# Patient Record
Sex: FEMALE | Race: BLACK OR AFRICAN AMERICAN | NOT HISPANIC OR LATINO | URBAN - METROPOLITAN AREA
[De-identification: names, ages, dates, MRNs, and addresses within clinical notes are randomized per-mention and may not be internally consistent; named-entity substitution may affect disease eponyms.]

---

## 2019-03-14 VITALS
DIASTOLIC BLOOD PRESSURE: 80 MMHG | SYSTOLIC BLOOD PRESSURE: 160 MMHG | RESPIRATION RATE: 16 BRPM | TEMPERATURE: 99 F | WEIGHT: 117.95 LBS | OXYGEN SATURATION: 98 % | HEART RATE: 63 BPM | HEIGHT: 61 IN

## 2019-03-14 NOTE — H&P ADULT - ASSESSMENT
62yo F, former smoker (quit 4mo ago), with PMHx of PVD s/p prior interventions (last this year @ [facility]), HTN, HLD, CAD s/p PCI 2018 @ [place], CVA (seen on CT scan per pt, no residuals), NIDDM-II, heart murmur, cataracts, asthma/COPD (no hospitalizations/intubations), sickle cell trait who presented to her cardiologist's office complaining of intermittent, sharp, non-radiating, 7/10 SSCP occurring both on exertion and at rest that lasts for about 20-30min before resolving on its own, subsequently was found to have an abnormal stress echo. Thus, in light of patient's risk factors, class IV anginal symptoms and abnormal stress echo, she is now referred to [facility] for recommended cardiac catheterization with possible intervention.    - 62yo F, former smoker (quit 4mo ago), with PMHx of PVD s/p prior interventions (last this year @ [facility]), HTN, HLD, CAD s/p PCI 2018 @ [place], CVA (seen on CT scan per pt, no residuals), NIDDM-II, heart murmur, cataracts, asthma/COPD (no hospitalizations/intubations), sickle cell trait who presented to her cardiologist's office complaining of intermittent, sharp, non-radiating, 7/10 SSCP occurring both on exertion and at rest that lasts for about 20-30min before resolving on its own, subsequently was found to have an abnormal stress echo. Thus, in light of patient's risk factors, class IV anginal symptoms and abnormal stress echo, she is now referred to [facility] for recommended cardiac catheterization with possible intervention.    - Labs reviewed, K 3.8, will supplement with KCl 40 mEq x1   - Patient took ASA 81mg and Plavix 75mg this morning prior to arrival, reports compliance w/ both medications, denies missing any doses   - EKG w/ NSR, APCs, Q in III, TWI in aVR, V1   - Procedure and its risks/consequences/benefits were discussed with patient in detail. Patient verbalized understanding and agreed to proceed with the procedure. Consent was signed and placed in chart.

## 2019-03-14 NOTE — H&P ADULT - HISTORY OF PRESENT ILLNESS
PATIENT TO BRING IN ALL MEDS    62yo F, former smoker (quit 4mo ago), with PMHx of PVD s/p prior interventions (last this year @ [facility]), HTN, HLD, CAD s/p PCI 2018 @ [place], CVA (seen on CT scan per pt, no residuals), NIDDM-II, heart murmur, cataracts, asthma/COPD (no hospitalizations/intubations), sickle cell trait who presented to her cardiologist's office complaining of intermittent, sharp, non-radiating, 7/10 SSCP occurring both on exertion and at rest that lasts for about 20-30min before resolving on its own. Patient also endorses b/l LE claudication and heaviness on ambulation, right >left. Patient denies palpitations, SOB, PND, orthopnea, N/V, hematochezia, melena, LE edema, dizziness, syncope. Stress echo revealed apical hypokinesis, EF 55%, stage 1 diastolic dysfunction, mild LVH, mild MR, sclerotic aortic valve per MD note.     In light of patient's risk factors, class IV anginal symptoms and abnormal stress echo, she is now referred to [facility] for recommended cardiac catheterization with possible intervention. PATIENT TO BRING IN ALL MEDS    64yo F, former smoker (quit 4mo ago), with PMHx of PVD s/p prior interventions (last this year @ [facility]), HTN, HLD, CAD s/p PCI 2018 @ [place], CVA (seen on CT scan per pt, no residuals), NIDDM-II, heart murmur, cataracts, asthma/COPD (no hospitalizations/intubations), sickle cell trait who presented to her cardiologist's office complaining of intermittent, sharp, non-radiating, 7/10 SSCP occurring both on exertion and at rest that lasts for about 20-30min before resolving on its own. Patient also endorses b/l LE claudication and heaviness on ambulation, right >left. Patient denies palpitations, SOB, PND, orthopnea, N/V, hematochezia, melena, LE edema, dizziness, syncope. Stress echo revealed apical hypokinesis, EF 55%, stage 1 diastolic dysfunction, mild LVH, mild MR, sclerotic aortic valve per MD note.     In light of patient's risk factors, class IV anginal symptoms and abnormal stress echo, she is now referred to [facility] for recommended cardiac catheterization with possible intervention.    Cardiac Cath 7/18/18 @ [place]: CHAZ LPDA, proxRCA mild diffuse disease, LM normal, distalLAD mild disease, distalD1 mild disease, distalLCx mild disease, distalOM1/OM1 mild disease, EF 60% 64yo F, former smoker (quit 4mo ago), with PMHx of PVD s/p prior interventions (last this year @ [facility]), HTN, HLD, CAD s/p PCI 2018 @ [place], CVA (seen on CT scan per pt, no residuals), NIDDM-II, heart murmur, cataracts, asthma/COPD (no hospitalizations/intubations), sickle cell trait who presented to her cardiologist's office complaining of intermittent, sharp, non-radiating, 7/10 SSCP occurring both on exertion and at rest that lasts for about 20-30min before resolving on its own. Patient also endorses b/l LE claudication and heaviness on ambulation, right >left. Patient denies palpitations, SOB, PND, orthopnea, N/V, hematochezia, melena, LE edema, dizziness, syncope. Stress echo (3/2019) revealed apical hypokinesis, EF 55%, stage 1 diastolic dysfunction, mild LVH, mild MR, sclerotic aortic valve per MD note.     In light of patient's risk factors, class IV anginal symptoms and abnormal stress echo, she is now referred to [facility] for recommended cardiac catheterization with possible intervention.    Cardiac Cath 7/18/18 @ [place]: CHAZ LPDA, proxRCA mild diffuse disease, LM normal, distalLAD mild disease, distalD1 mild disease, distalLCx mild disease, distalOM1/OM1 mild disease, EF 60%

## 2019-03-14 NOTE — H&P ADULT - NSICDXPASTSURGICALHX_GEN_ALL_CORE_FT
PAST SURGICAL HISTORY:  Bilateral bunions     H/O cervical spine surgery     H/O tubal ligation     History of appendectomy

## 2019-03-14 NOTE — H&P ADULT - NSICDXPASTMEDICALHX_GEN_ALL_CORE_FT
PAST MEDICAL HISTORY:  CAD (coronary artery disease) s/p PCI    COPD (chronic obstructive pulmonary disease)     DM (diabetes mellitus)     HLD (hyperlipidemia)     HTN (hypertension)     PAD (peripheral artery disease) s/p peripheral interventions    Sickle cell trait

## 2019-03-15 ENCOUNTER — INPATIENT (INPATIENT)
Facility: HOSPITAL | Age: 64
LOS: 0 days | Discharge: HOME | DRG: 247 | End: 2019-03-16
Attending: INTERNAL MEDICINE | Admitting: INTERNAL MEDICINE
Payer: MEDICARE

## 2019-03-15 DIAGNOSIS — Z98.51 TUBAL LIGATION STATUS: Chronic | ICD-10-CM

## 2019-03-15 DIAGNOSIS — Z90.49 ACQUIRED ABSENCE OF OTHER SPECIFIED PARTS OF DIGESTIVE TRACT: Chronic | ICD-10-CM

## 2019-03-15 DIAGNOSIS — Z98.890 OTHER SPECIFIED POSTPROCEDURAL STATES: Chronic | ICD-10-CM

## 2019-03-15 DIAGNOSIS — M21.611 BUNION OF RIGHT FOOT: Chronic | ICD-10-CM

## 2019-03-15 LAB
ALBUMIN SERPL ELPH-MCNC: 4.5 G/DL — SIGNIFICANT CHANGE UP (ref 3.3–5)
ALP SERPL-CCNC: 96 U/L — SIGNIFICANT CHANGE UP (ref 40–120)
ALT FLD-CCNC: 10 U/L — SIGNIFICANT CHANGE UP (ref 10–45)
ANION GAP SERPL CALC-SCNC: 13 MMOL/L — SIGNIFICANT CHANGE UP (ref 5–17)
APTT BLD: 37.6 SEC — HIGH (ref 27.5–36.3)
AST SERPL-CCNC: 16 U/L — SIGNIFICANT CHANGE UP (ref 10–40)
BASOPHILS # BLD AUTO: 0.03 K/UL — SIGNIFICANT CHANGE UP (ref 0–0.2)
BASOPHILS NFR BLD AUTO: 0.5 % — SIGNIFICANT CHANGE UP (ref 0–2)
BILIRUB SERPL-MCNC: <0.2 MG/DL — SIGNIFICANT CHANGE UP (ref 0.2–1.2)
BUN SERPL-MCNC: 9 MG/DL — SIGNIFICANT CHANGE UP (ref 7–23)
CALCIUM SERPL-MCNC: 9.7 MG/DL — SIGNIFICANT CHANGE UP (ref 8.4–10.5)
CHLORIDE SERPL-SCNC: 107 MMOL/L — SIGNIFICANT CHANGE UP (ref 96–108)
CHOLEST SERPL-MCNC: 102 MG/DL — SIGNIFICANT CHANGE UP (ref 10–199)
CK MB CFR SERPL CALC: 1.2 NG/ML — SIGNIFICANT CHANGE UP (ref 0–6.7)
CK SERPL-CCNC: 99 U/L — SIGNIFICANT CHANGE UP (ref 25–170)
CO2 SERPL-SCNC: 24 MMOL/L — SIGNIFICANT CHANGE UP (ref 22–31)
CREAT SERPL-MCNC: 1.06 MG/DL — SIGNIFICANT CHANGE UP (ref 0.5–1.3)
EOSINOPHIL # BLD AUTO: 0.08 K/UL — SIGNIFICANT CHANGE UP (ref 0–0.5)
EOSINOPHIL NFR BLD AUTO: 1.4 % — SIGNIFICANT CHANGE UP (ref 0–6)
GLUCOSE BLDC GLUCOMTR-MCNC: 180 MG/DL — HIGH (ref 70–99)
GLUCOSE SERPL-MCNC: 84 MG/DL — SIGNIFICANT CHANGE UP (ref 70–99)
HBA1C BLD-MCNC: 9.2 % — HIGH (ref 4–5.6)
HCT VFR BLD CALC: 33.1 % — LOW (ref 34.5–45)
HDLC SERPL-MCNC: 34 MG/DL — LOW
HGB BLD-MCNC: 10.9 G/DL — LOW (ref 11.5–15.5)
IMM GRANULOCYTES NFR BLD AUTO: 0.2 % — SIGNIFICANT CHANGE UP (ref 0–1.5)
INR BLD: 1.13 — SIGNIFICANT CHANGE UP (ref 0.88–1.16)
LIPID PNL WITH DIRECT LDL SERPL: 47 MG/DL — SIGNIFICANT CHANGE UP
LYMPHOCYTES # BLD AUTO: 2.73 K/UL — SIGNIFICANT CHANGE UP (ref 1–3.3)
LYMPHOCYTES # BLD AUTO: 47.8 % — HIGH (ref 13–44)
MCHC RBC-ENTMCNC: 27.7 PG — SIGNIFICANT CHANGE UP (ref 27–34)
MCHC RBC-ENTMCNC: 32.9 GM/DL — SIGNIFICANT CHANGE UP (ref 32–36)
MCV RBC AUTO: 84 FL — SIGNIFICANT CHANGE UP (ref 80–100)
MONOCYTES # BLD AUTO: 0.46 K/UL — SIGNIFICANT CHANGE UP (ref 0–0.9)
MONOCYTES NFR BLD AUTO: 8.1 % — SIGNIFICANT CHANGE UP (ref 2–14)
NEUTROPHILS # BLD AUTO: 2.4 K/UL — SIGNIFICANT CHANGE UP (ref 1.8–7.4)
NEUTROPHILS NFR BLD AUTO: 42 % — LOW (ref 43–77)
NRBC # BLD: 0 /100 WBCS — SIGNIFICANT CHANGE UP (ref 0–0)
PLATELET # BLD AUTO: 237 K/UL — SIGNIFICANT CHANGE UP (ref 150–400)
POTASSIUM SERPL-MCNC: 3.8 MMOL/L — SIGNIFICANT CHANGE UP (ref 3.5–5.3)
POTASSIUM SERPL-SCNC: 3.8 MMOL/L — SIGNIFICANT CHANGE UP (ref 3.5–5.3)
PROT SERPL-MCNC: 7.6 G/DL — SIGNIFICANT CHANGE UP (ref 6–8.3)
PROTHROM AB SERPL-ACNC: 12.8 SEC — SIGNIFICANT CHANGE UP (ref 10–12.9)
RBC # BLD: 3.94 M/UL — SIGNIFICANT CHANGE UP (ref 3.8–5.2)
RBC # FLD: 13.8 % — SIGNIFICANT CHANGE UP (ref 10.3–14.5)
SODIUM SERPL-SCNC: 144 MMOL/L — SIGNIFICANT CHANGE UP (ref 135–145)
TOTAL CHOLESTEROL/HDL RATIO MEASUREMENT: 3 RATIO — LOW (ref 3.3–7.1)
TRIGL SERPL-MCNC: 103 MG/DL — SIGNIFICANT CHANGE UP (ref 10–149)
WBC # BLD: 5.71 K/UL — SIGNIFICANT CHANGE UP (ref 3.8–10.5)
WBC # FLD AUTO: 5.71 K/UL — SIGNIFICANT CHANGE UP (ref 3.8–10.5)

## 2019-03-15 PROCEDURE — 92928 PRQ TCAT PLMT NTRAC ST 1 LES: CPT | Mod: RI

## 2019-03-15 PROCEDURE — 93458 L HRT ARTERY/VENTRICLE ANGIO: CPT | Mod: 26,XU

## 2019-03-15 RX ORDER — NIFEDIPINE 30 MG
30 TABLET, EXTENDED RELEASE 24 HR ORAL ONCE
Qty: 0 | Refills: 0 | Status: COMPLETED | OUTPATIENT
Start: 2019-03-15 | End: 2019-03-15

## 2019-03-15 RX ORDER — METFORMIN HYDROCHLORIDE 850 MG/1
1 TABLET ORAL
Qty: 0 | Refills: 0 | COMMUNITY

## 2019-03-15 RX ORDER — INSULIN LISPRO 100/ML
VIAL (ML) SUBCUTANEOUS ONCE
Qty: 0 | Refills: 0 | Status: COMPLETED | OUTPATIENT
Start: 2019-03-15 | End: 2019-03-15

## 2019-03-15 RX ORDER — DEXTROSE 50 % IN WATER 50 %
15 SYRINGE (ML) INTRAVENOUS ONCE
Qty: 0 | Refills: 0 | Status: DISCONTINUED | OUTPATIENT
Start: 2019-03-15 | End: 2019-03-16

## 2019-03-15 RX ORDER — SODIUM CHLORIDE 9 MG/ML
1000 INJECTION, SOLUTION INTRAVENOUS
Qty: 0 | Refills: 0 | Status: DISCONTINUED | OUTPATIENT
Start: 2019-03-15 | End: 2019-03-16

## 2019-03-15 RX ORDER — CHLORHEXIDINE GLUCONATE 213 G/1000ML
1 SOLUTION TOPICAL ONCE
Qty: 0 | Refills: 0 | Status: DISCONTINUED | OUTPATIENT
Start: 2019-03-15 | End: 2019-03-15

## 2019-03-15 RX ORDER — ATORVASTATIN CALCIUM 80 MG/1
80 TABLET, FILM COATED ORAL DAILY
Qty: 0 | Refills: 0 | Status: DISCONTINUED | OUTPATIENT
Start: 2019-03-15 | End: 2019-03-16

## 2019-03-15 RX ORDER — POTASSIUM CHLORIDE 20 MEQ
40 PACKET (EA) ORAL ONCE
Qty: 0 | Refills: 0 | Status: COMPLETED | OUTPATIENT
Start: 2019-03-15 | End: 2019-03-15

## 2019-03-15 RX ORDER — NIFEDIPINE 30 MG
60 TABLET, EXTENDED RELEASE 24 HR ORAL DAILY
Qty: 0 | Refills: 0 | Status: DISCONTINUED | OUTPATIENT
Start: 2019-03-16 | End: 2019-03-16

## 2019-03-15 RX ORDER — CLOPIDOGREL BISULFATE 75 MG/1
75 TABLET, FILM COATED ORAL DAILY
Qty: 0 | Refills: 0 | Status: DISCONTINUED | OUTPATIENT
Start: 2019-03-15 | End: 2019-03-15

## 2019-03-15 RX ORDER — SODIUM CHLORIDE 9 MG/ML
500 INJECTION INTRAMUSCULAR; INTRAVENOUS; SUBCUTANEOUS
Qty: 0 | Refills: 0 | Status: DISCONTINUED | OUTPATIENT
Start: 2019-03-15 | End: 2019-03-16

## 2019-03-15 RX ORDER — DEXTROSE 50 % IN WATER 50 %
25 SYRINGE (ML) INTRAVENOUS ONCE
Qty: 0 | Refills: 0 | Status: DISCONTINUED | OUTPATIENT
Start: 2019-03-15 | End: 2019-03-16

## 2019-03-15 RX ORDER — GLUCAGON INJECTION, SOLUTION 0.5 MG/.1ML
1 INJECTION, SOLUTION SUBCUTANEOUS ONCE
Qty: 0 | Refills: 0 | Status: DISCONTINUED | OUTPATIENT
Start: 2019-03-15 | End: 2019-03-16

## 2019-03-15 RX ORDER — CILOSTAZOL 100 MG/1
1 TABLET ORAL
Qty: 0 | Refills: 0 | COMMUNITY

## 2019-03-15 RX ORDER — DEXTROSE 50 % IN WATER 50 %
12.5 SYRINGE (ML) INTRAVENOUS ONCE
Qty: 0 | Refills: 0 | Status: DISCONTINUED | OUTPATIENT
Start: 2019-03-15 | End: 2019-03-16

## 2019-03-15 RX ORDER — METOPROLOL TARTRATE 50 MG
50 TABLET ORAL DAILY
Qty: 0 | Refills: 0 | Status: DISCONTINUED | OUTPATIENT
Start: 2019-03-16 | End: 2019-03-16

## 2019-03-15 RX ORDER — LOSARTAN POTASSIUM 100 MG/1
1 TABLET, FILM COATED ORAL
Qty: 0 | Refills: 0 | COMMUNITY

## 2019-03-15 RX ORDER — CLOPIDOGREL BISULFATE 75 MG/1
75 TABLET, FILM COATED ORAL DAILY
Qty: 0 | Refills: 0 | Status: DISCONTINUED | OUTPATIENT
Start: 2019-03-16 | End: 2019-03-16

## 2019-03-15 RX ORDER — NIFEDIPINE 30 MG
1 TABLET, EXTENDED RELEASE 24 HR ORAL
Qty: 0 | Refills: 0 | COMMUNITY

## 2019-03-15 RX ORDER — SODIUM CHLORIDE 9 MG/ML
500 INJECTION INTRAMUSCULAR; INTRAVENOUS; SUBCUTANEOUS
Qty: 0 | Refills: 0 | Status: DISCONTINUED | OUTPATIENT
Start: 2019-03-15 | End: 2019-03-15

## 2019-03-15 RX ORDER — INSULIN LISPRO 100/ML
VIAL (ML) SUBCUTANEOUS
Qty: 0 | Refills: 0 | Status: DISCONTINUED | OUTPATIENT
Start: 2019-03-15 | End: 2019-03-16

## 2019-03-15 RX ORDER — SITAGLIPTIN 50 MG/1
1 TABLET, FILM COATED ORAL
Qty: 0 | Refills: 0 | COMMUNITY

## 2019-03-15 RX ORDER — METOPROLOL TARTRATE 50 MG
1 TABLET ORAL
Qty: 0 | Refills: 0 | COMMUNITY

## 2019-03-15 RX ORDER — ASPIRIN/CALCIUM CARB/MAGNESIUM 324 MG
81 TABLET ORAL DAILY
Qty: 0 | Refills: 0 | Status: DISCONTINUED | OUTPATIENT
Start: 2019-03-16 | End: 2019-03-16

## 2019-03-15 RX ORDER — CILOSTAZOL 100 MG/1
50 TABLET ORAL
Qty: 0 | Refills: 0 | Status: DISCONTINUED | OUTPATIENT
Start: 2019-03-15 | End: 2019-03-16

## 2019-03-15 RX ADMIN — SODIUM CHLORIDE 75 MILLILITER(S): 9 INJECTION INTRAMUSCULAR; INTRAVENOUS; SUBCUTANEOUS at 21:00

## 2019-03-15 RX ADMIN — Medication 1: at 22:44

## 2019-03-15 RX ADMIN — CILOSTAZOL 50 MILLIGRAM(S): 100 TABLET ORAL at 22:38

## 2019-03-15 RX ADMIN — ATORVASTATIN CALCIUM 80 MILLIGRAM(S): 80 TABLET, FILM COATED ORAL at 22:38

## 2019-03-15 RX ADMIN — Medication 30 MILLIGRAM(S): at 22:38

## 2019-03-15 RX ADMIN — Medication 40 MILLIEQUIVALENT(S): at 17:35

## 2019-03-16 VITALS — TEMPERATURE: 99 F

## 2019-03-16 LAB
ANION GAP SERPL CALC-SCNC: 14 MMOL/L — SIGNIFICANT CHANGE UP (ref 5–17)
BASOPHILS # BLD AUTO: 0.04 K/UL — SIGNIFICANT CHANGE UP (ref 0–0.2)
BASOPHILS NFR BLD AUTO: 0.6 % — SIGNIFICANT CHANGE UP (ref 0–2)
BUN SERPL-MCNC: 10 MG/DL — SIGNIFICANT CHANGE UP (ref 7–23)
CALCIUM SERPL-MCNC: 10.2 MG/DL — SIGNIFICANT CHANGE UP (ref 8.4–10.5)
CHLORIDE SERPL-SCNC: 104 MMOL/L — SIGNIFICANT CHANGE UP (ref 96–108)
CO2 SERPL-SCNC: 23 MMOL/L — SIGNIFICANT CHANGE UP (ref 22–31)
CREAT SERPL-MCNC: 0.98 MG/DL — SIGNIFICANT CHANGE UP (ref 0.5–1.3)
EOSINOPHIL # BLD AUTO: 0.08 K/UL — SIGNIFICANT CHANGE UP (ref 0–0.5)
EOSINOPHIL NFR BLD AUTO: 1.3 % — SIGNIFICANT CHANGE UP (ref 0–6)
GLUCOSE BLDC GLUCOMTR-MCNC: 102 MG/DL — HIGH (ref 70–99)
GLUCOSE BLDC GLUCOMTR-MCNC: 239 MG/DL — HIGH (ref 70–99)
GLUCOSE SERPL-MCNC: 127 MG/DL — HIGH (ref 70–99)
HCT VFR BLD CALC: 38.8 % — SIGNIFICANT CHANGE UP (ref 34.5–45)
HGB BLD-MCNC: 12.6 G/DL — SIGNIFICANT CHANGE UP (ref 11.5–15.5)
IMM GRANULOCYTES NFR BLD AUTO: 0.2 % — SIGNIFICANT CHANGE UP (ref 0–1.5)
LYMPHOCYTES # BLD AUTO: 2.17 K/UL — SIGNIFICANT CHANGE UP (ref 1–3.3)
LYMPHOCYTES # BLD AUTO: 35.2 % — SIGNIFICANT CHANGE UP (ref 13–44)
MAGNESIUM SERPL-MCNC: 2 MG/DL — SIGNIFICANT CHANGE UP (ref 1.6–2.6)
MCHC RBC-ENTMCNC: 27.1 PG — SIGNIFICANT CHANGE UP (ref 27–34)
MCHC RBC-ENTMCNC: 32.5 GM/DL — SIGNIFICANT CHANGE UP (ref 32–36)
MCV RBC AUTO: 83.4 FL — SIGNIFICANT CHANGE UP (ref 80–100)
MONOCYTES # BLD AUTO: 0.57 K/UL — SIGNIFICANT CHANGE UP (ref 0–0.9)
MONOCYTES NFR BLD AUTO: 9.3 % — SIGNIFICANT CHANGE UP (ref 2–14)
NEUTROPHILS # BLD AUTO: 3.29 K/UL — SIGNIFICANT CHANGE UP (ref 1.8–7.4)
NEUTROPHILS NFR BLD AUTO: 53.4 % — SIGNIFICANT CHANGE UP (ref 43–77)
NRBC # BLD: 0 /100 WBCS — SIGNIFICANT CHANGE UP (ref 0–0)
PLATELET # BLD AUTO: 256 K/UL — SIGNIFICANT CHANGE UP (ref 150–400)
POTASSIUM SERPL-MCNC: 4.3 MMOL/L — SIGNIFICANT CHANGE UP (ref 3.5–5.3)
POTASSIUM SERPL-SCNC: 4.3 MMOL/L — SIGNIFICANT CHANGE UP (ref 3.5–5.3)
RBC # BLD: 4.65 M/UL — SIGNIFICANT CHANGE UP (ref 3.8–5.2)
RBC # FLD: 13.9 % — SIGNIFICANT CHANGE UP (ref 10.3–14.5)
SODIUM SERPL-SCNC: 141 MMOL/L — SIGNIFICANT CHANGE UP (ref 135–145)
WBC # BLD: 6.16 K/UL — SIGNIFICANT CHANGE UP (ref 3.8–10.5)
WBC # FLD AUTO: 6.16 K/UL — SIGNIFICANT CHANGE UP (ref 3.8–10.5)

## 2019-03-16 PROCEDURE — 83036 HEMOGLOBIN GLYCOSYLATED A1C: CPT

## 2019-03-16 PROCEDURE — C1874: CPT

## 2019-03-16 PROCEDURE — 80061 LIPID PANEL: CPT

## 2019-03-16 PROCEDURE — C1725: CPT

## 2019-03-16 PROCEDURE — 82553 CREATINE MB FRACTION: CPT

## 2019-03-16 PROCEDURE — 82962 GLUCOSE BLOOD TEST: CPT

## 2019-03-16 PROCEDURE — 85730 THROMBOPLASTIN TIME PARTIAL: CPT

## 2019-03-16 PROCEDURE — 85610 PROTHROMBIN TIME: CPT

## 2019-03-16 PROCEDURE — C1769: CPT

## 2019-03-16 PROCEDURE — 83735 ASSAY OF MAGNESIUM: CPT

## 2019-03-16 PROCEDURE — 99217: CPT

## 2019-03-16 PROCEDURE — 99232 SBSQ HOSP IP/OBS MODERATE 35: CPT | Mod: 25

## 2019-03-16 PROCEDURE — C1887: CPT

## 2019-03-16 PROCEDURE — 80053 COMPREHEN METABOLIC PANEL: CPT

## 2019-03-16 PROCEDURE — 85025 COMPLETE CBC W/AUTO DIFF WBC: CPT

## 2019-03-16 PROCEDURE — 80048 BASIC METABOLIC PNL TOTAL CA: CPT

## 2019-03-16 PROCEDURE — 82550 ASSAY OF CK (CPK): CPT

## 2019-03-16 PROCEDURE — C1894: CPT

## 2019-03-16 RX ORDER — ASPIRIN/CALCIUM CARB/MAGNESIUM 324 MG
1 TABLET ORAL
Qty: 0 | Refills: 0 | COMMUNITY

## 2019-03-16 RX ORDER — ROSUVASTATIN CALCIUM 5 MG/1
1 TABLET ORAL
Qty: 30 | Refills: 2 | OUTPATIENT
Start: 2019-03-16 | End: 2019-06-13

## 2019-03-16 RX ORDER — ROSUVASTATIN CALCIUM 5 MG/1
1 TABLET ORAL
Qty: 0 | Refills: 0 | COMMUNITY

## 2019-03-16 RX ORDER — CLOPIDOGREL BISULFATE 75 MG/1
1 TABLET, FILM COATED ORAL
Qty: 30 | Refills: 11 | OUTPATIENT
Start: 2019-03-16 | End: 2020-03-09

## 2019-03-16 RX ORDER — CLOPIDOGREL BISULFATE 75 MG/1
1 TABLET, FILM COATED ORAL
Qty: 0 | Refills: 0 | COMMUNITY

## 2019-03-16 RX ORDER — ASPIRIN/CALCIUM CARB/MAGNESIUM 324 MG
1 TABLET ORAL
Qty: 30 | Refills: 11 | OUTPATIENT
Start: 2019-03-16 | End: 2020-03-09

## 2019-03-16 RX ADMIN — CILOSTAZOL 50 MILLIGRAM(S): 100 TABLET ORAL at 05:48

## 2019-03-16 RX ADMIN — CLOPIDOGREL BISULFATE 75 MILLIGRAM(S): 75 TABLET, FILM COATED ORAL at 11:26

## 2019-03-16 RX ADMIN — Medication 60 MILLIGRAM(S): at 05:48

## 2019-03-16 RX ADMIN — Medication 81 MILLIGRAM(S): at 11:26

## 2019-03-16 RX ADMIN — Medication 1 TABLET(S): at 11:26

## 2019-03-16 RX ADMIN — Medication 2: at 11:37

## 2019-03-16 RX ADMIN — Medication 50 MILLIGRAM(S): at 05:48

## 2019-03-16 NOTE — DISCHARGE NOTE PROVIDER - NSDCCPCAREPLAN_GEN_ALL_CORE_FT
PRINCIPAL DISCHARGE DIAGNOSIS  Diagnosis: CAD (coronary artery disease)  Assessment and Plan of Treatment: You had a cardiac angiogram with stent placement to one of your heart arteries (Ramus). Continue Aspirin and Plavix. DO NOT STOP TAKING THESE MEDICATIONS UNLESS INSTRUCTED BY YOUR CARDIOLOGIST AS YOUR STENT CAN CLOSE RESULTING IN HEART ATTACK. Follow-up with Dr. Lemos in 1-2 weeks. Maintain Low Fat, Low Salt and Low Cholesterol Diet. Continue Crestor.      SECONDARY DISCHARGE DIAGNOSES  Diagnosis: Hypertension  Assessment and Plan of Treatment: Maintain Low Salt Diet -Less than 2000 mg (2 g) daily. Continue Nifedipine, Metoprolol, and Losartan.    Diagnosis: Type 2 diabetes mellitus  Assessment and Plan of Treatment: Monitor Fingersticks. Continue Glipizide and Januvia. DO NOT TAKE METFORMIN UNTIL MONDAY 3/18/19. PRINCIPAL DISCHARGE DIAGNOSIS  Diagnosis: CAD (coronary artery disease)  Assessment and Plan of Treatment: You had a cardiac angiogram with stent placement to one of your heart arteries (Ramus). Continue Aspirin and Plavix. DO NOT STOP TAKING THESE MEDICATIONS UNLESS INSTRUCTED BY YOUR CARDIOLOGIST AS YOUR STENT CAN CLOSE RESULTING IN HEART ATTACK AND DEATH. Follow-up with Dr. Lemos in 1-2 weeks. Maintain Low Fat, Low Salt and Low Cholesterol Diet. Continue Crestor.      SECONDARY DISCHARGE DIAGNOSES  Diagnosis: Hypertension  Assessment and Plan of Treatment: Maintain Low Salt Diet -Less than 2000 mg (2 g) daily. Continue Nifedipine, Metoprolol, and Losartan.    Diagnosis: Type 2 diabetes mellitus  Assessment and Plan of Treatment: Monitor Fingersticks. Continue Glipizide and Januvia. DO NOT TAKE METFORMIN UNTIL MONDAY 3/18/19.

## 2019-03-16 NOTE — DISCHARGE NOTE PROVIDER - INSTRUCTIONS
weeks. Maintain Low Fat, Low Salt and Low Cholesterol Diet.     You underwent a coronary angiogram and should wait 3 days before returning to ordinary activities. Do not drive for 2 days. Consult your doctor before returning to vigorous activity. You may return to work in 3-5 days. The catheter from your right wrist was removed. You may shower once the dressing is removed, but avoid baths, hot tubs, or swimming for 5 days to prevent infection. If you notice bleeding from the site, hardening and pain at the site, drainage or redness from the site, coolness/paleness of the right leg, weakness/paralysis of right leg, swelling, or fever, please call [phone]. Please continue your Aspirin and Plavix as prescribed unless otherwise indicated by your cardiologist. All of your prescriptions have been sent to the pharmacy. Please follow up with Dr. Lemos in 1-2 weeks. All of your needed prescriptions have been sent electronically to your pharmacy.

## 2019-03-16 NOTE — DISCHARGE NOTE PROVIDER - HOSPITAL COURSE
64 yo F, former smoker (quit 4mo ago), with PMHx of PVD s/p prior interventions (last this year @ [facility]), HTN, HLD, CAD s/p PCI 2018 @ [place], CVA (seen on CT scan per pt, no residuals), DM Type II, heart murmur, cataracts, asthma/COPD (no hospitalizations/intubations), sickle cell trait who presented to her cardiologist's office complaining of intermittent, sharp, non-radiating, 7/10 SSCP occurring both on exertion and at rest that lasts for about 20-30min before resolving on its own. Patient also endorses b/l LE claudication and heaviness on ambulation, right >left. Patient denies palpitations, SOB, PND, orthopnea, N/V, hematochezia, melena, LE edema, dizziness, syncope. Stress echo (3/2019) revealed apical hypokinesis, EF 55%, stage 1 diastolic dysfunction, mild LVH, mild MR, sclerotic aortic valve per MD note. In light of patient's risk factors, class IV anginal symptoms and abnormal stress echo, she presented to [facility] for recommended cardiac catheterization with possible intervention. Patient s/p cardiac cath 3/15/19: CHAZ Ramus (99%). LM normal, mild luminal irregularities of LAD/LCx, LPDA stent patent. EF 60%. Patient developed proximal hematoma for which 2 radial bands were placed and removed without issue. Patient with small residual proximal radial hematoma however improving from yesterday. Labs, telemetry and EKG reviewed. Patient C/P free and HD stable and cleared for discharged by Dr. Menon. Prescriptions for ASA, Plavix, and Crestor E-Prescribed to [facility].     V/S:	BP: 110-140’s/60-70s		HR: 60-70s	RR: 18 	Temp: 98.4 F    Monitor-NSR 			O2 sat- 95% RA     	    GENERAL:  Sitting in bed in no acute distress    CVS: + S1 S2. RRR. No murmurs, rubs or gallops.     Pulm: CTA Bilaterally. No rhonchi, wheezes, or rales.    Abd: BS x 4. Soft NT/ND.    Ext: No clubbing, cyanosis, or edema BLE. No calf tenderness BLE.     Right Radial: Radial Pulse 2+. No bleed. +Small proximal hematoma. Sensation Intact. 5/5  Strength

## 2019-03-16 NOTE — DISCHARGE NOTE PROVIDER - CARE PROVIDER_API CALL
Santiago Lemos)  Cardiovascular Disease; Interventional Cardiology; Nuclear Cardiology  [address] [address]  Phone: [phone]  Fax: [phone]  Follow Up Time:

## 2019-03-16 NOTE — DISCHARGE NOTE NURSING/CASE MANAGEMENT/SOCIAL WORK - NSDCDPATPORTLINK_GEN_ALL_CORE
You can access the [facility], offered by [facility], by registering with the following website: [email or web address]

## 2019-03-20 DIAGNOSIS — I10 ESSENTIAL (PRIMARY) HYPERTENSION: ICD-10-CM

## 2019-03-20 DIAGNOSIS — I08.0 RHEUMATIC DISORDERS OF BOTH MITRAL AND AORTIC VALVES: ICD-10-CM

## 2019-03-20 DIAGNOSIS — Z87.891 PERSONAL HISTORY OF NICOTINE DEPENDENCE: ICD-10-CM

## 2019-03-20 DIAGNOSIS — E11.51 TYPE 2 DIABETES MELLITUS WITH DIABETIC PERIPHERAL ANGIOPATHY WITHOUT GANGRENE: ICD-10-CM

## 2019-03-20 DIAGNOSIS — J44.9 CHRONIC OBSTRUCTIVE PULMONARY DISEASE, UNSPECIFIED: ICD-10-CM

## 2019-03-20 DIAGNOSIS — Z86.73 PERSONAL HISTORY OF TRANSIENT ISCHEMIC ATTACK (TIA), AND CEREBRAL INFARCTION WITHOUT RESIDUAL DEFICITS: ICD-10-CM

## 2019-03-20 DIAGNOSIS — E11.9 TYPE 2 DIABETES MELLITUS WITHOUT COMPLICATIONS: ICD-10-CM

## 2019-03-20 DIAGNOSIS — D57.3 SICKLE-CELL TRAIT: ICD-10-CM

## 2019-03-20 DIAGNOSIS — E78.5 HYPERLIPIDEMIA, UNSPECIFIED: ICD-10-CM

## 2019-03-20 DIAGNOSIS — Z98.61 CORONARY ANGIOPLASTY STATUS: ICD-10-CM

## 2019-03-20 DIAGNOSIS — I73.9 PERIPHERAL VASCULAR DISEASE, UNSPECIFIED: ICD-10-CM

## 2019-03-20 DIAGNOSIS — R07.9 CHEST PAIN, UNSPECIFIED: ICD-10-CM

## 2019-03-20 DIAGNOSIS — J45.909 UNSPECIFIED ASTHMA, UNCOMPLICATED: ICD-10-CM

## 2019-03-20 DIAGNOSIS — H26.9 UNSPECIFIED CATARACT: ICD-10-CM

## 2019-03-20 DIAGNOSIS — I25.118 ATHEROSCLEROTIC HEART DISEASE OF NATIVE CORONARY ARTERY WITH OTHER FORMS OF ANGINA PECTORIS: ICD-10-CM

## 2019-05-27 NOTE — H&P ADULT - RS GEN PE MLT RESP DETAILS PC
Complains of a cough about a month ago and was given an antibiotic. States then over the past week developed cough and nasal congestion again.  States cough is productive with green sputum.    clear to auscultation bilaterally

## 2024-12-07 NOTE — PATIENT PROFILE ADULT - NSPROMEDSBROUGHTTOHOSP_GEN_A_NUR
Patient reports chest pain that started yesterday in the center of her chest. Patient denies any other associated symptoms.      Triage Assessment (Adult)       Row Name 12/06/24 4035          Triage Assessment    Airway WDL WDL        Respiratory WDL    Respiratory WDL WDL        Skin Circulation/Temperature WDL    Skin Circulation/Temperature WDL WDL        Cardiac WDL    Cardiac WDL X;chest pain        Chest Pain Assessment    Chest Pain Location midsternal     Character sharp     Precipitating Factors nothing     Alleviating Factors nothing     Chest Pain Intervention cardiac biomarkers drawn;12-lead ECG obtained;cardiac monitor placed;activity minimized        Peripheral/Neurovascular WDL    Peripheral Neurovascular WDL WDL        Cognitive/Neuro/Behavioral WDL    Cognitive/Neuro/Behavioral WDL WDL                     
no

## 2025-02-03 PROBLEM — J44.9 CHRONIC OBSTRUCTIVE PULMONARY DISEASE, UNSPECIFIED: Chronic | Status: ACTIVE | Noted: 2019-03-14

## 2025-02-03 PROBLEM — D57.3 SICKLE-CELL TRAIT: Chronic | Status: ACTIVE | Noted: 2019-03-14

## 2025-02-03 PROBLEM — E78.5 HYPERLIPIDEMIA, UNSPECIFIED: Chronic | Status: ACTIVE | Noted: 2019-03-14

## 2025-02-03 PROBLEM — I10 ESSENTIAL (PRIMARY) HYPERTENSION: Chronic | Status: ACTIVE | Noted: 2019-03-14

## 2025-02-03 PROBLEM — I73.9 PERIPHERAL VASCULAR DISEASE, UNSPECIFIED: Chronic | Status: ACTIVE | Noted: 2019-03-14

## 2025-02-03 PROBLEM — I25.10 ATHEROSCLEROTIC HEART DISEASE OF NATIVE CORONARY ARTERY WITHOUT ANGINA PECTORIS: Chronic | Status: ACTIVE | Noted: 2019-03-14

## 2025-02-03 PROBLEM — E11.9 TYPE 2 DIABETES MELLITUS WITHOUT COMPLICATIONS: Chronic | Status: ACTIVE | Noted: 2019-03-14

## 2025-02-18 ENCOUNTER — OUTPATIENT (OUTPATIENT)
Dept: OUTPATIENT SERVICES | Facility: HOSPITAL | Age: 70
LOS: 1 days | End: 2025-02-18
Payer: MEDICARE

## 2025-02-18 DIAGNOSIS — M21.611 BUNION OF RIGHT FOOT: Chronic | ICD-10-CM

## 2025-02-18 DIAGNOSIS — Z98.890 OTHER SPECIFIED POSTPROCEDURAL STATES: Chronic | ICD-10-CM

## 2025-02-18 DIAGNOSIS — J98.4 OTHER DISORDERS OF LUNG: ICD-10-CM

## 2025-02-18 DIAGNOSIS — Z90.49 ACQUIRED ABSENCE OF OTHER SPECIFIED PARTS OF DIGESTIVE TRACT: Chronic | ICD-10-CM

## 2025-02-18 DIAGNOSIS — Z98.51 TUBAL LIGATION STATUS: Chronic | ICD-10-CM

## 2025-02-18 PROCEDURE — 94621 CARDIOPULM EXERCISE TESTING: CPT

## 2025-02-18 PROCEDURE — 94621 CARDIOPULM EXERCISE TESTING: CPT | Mod: 26

## 2025-02-18 RX ORDER — ALBUTEROL SULFATE 2.5 MG/3ML
2 VIAL, NEBULIZER (ML) INHALATION ONCE
Refills: 0 | Status: DISCONTINUED | OUTPATIENT
Start: 2025-02-18 | End: 2025-03-04